# Patient Record
Sex: FEMALE | Race: WHITE
[De-identification: names, ages, dates, MRNs, and addresses within clinical notes are randomized per-mention and may not be internally consistent; named-entity substitution may affect disease eponyms.]

---

## 2017-02-08 ENCOUNTER — HOSPITAL ENCOUNTER (OUTPATIENT)
Dept: HOSPITAL 80 - FIMAGING | Age: 57
End: 2017-02-08
Attending: FAMILY MEDICINE
Payer: COMMERCIAL

## 2017-02-08 DIAGNOSIS — M85.80: ICD-10-CM

## 2017-02-08 DIAGNOSIS — Z13.820: Primary | ICD-10-CM

## 2017-02-08 NOTE — DX
DEXA Bone Mineral Densitometry    



Clinical Indications:  Postmenopausal, screening for osteoporosis



Comparison: None



Technique:  Bone Mineral Densitometry (BMD) by Dual Energy X-Ray Absorptiometry (DEXA) was performed 
utilizing the [employer] scanner.  The lumbar spine was evaluated in the AP projection.  The bilat
eral hips and  forearm were evaluated in the AP projection. Vertebral fracture assessment was also pe
rformed.



AP Lumbar Spine:  The L1, L2 and L4 vertebral bodies were evaluated. L3 is excluded due to degenerati
ve sclerosis. 

BMD:  1.327 gm/cm2 

T-score:  1.2 SD 

Z-score:  1.9 SD





AP Left Hip: Neck

BMD:  0.924 gm/cm2 

T-score:  -0.8 SD 

Z-score: 0.1 SD





AP Right Hip: Neck

BMD:  0.891 gm/cm2 

T-score:  -1.1 SD 

Z-score: -0.1 SD



 

AP Left Forearm, 1/3:  

BMD:  0.872 gm/cm2 

T-score:  0 SD 

Z-score: 0.7 SD





Vertebral Fracture Assessment:  No significant fracture deformity. No prevertebral aortic calcificati
on, significant marginal bone spurring, facet arthrosis,  or intrinsic vertebral body sclerosis that 
would effect the accuracy of the lumbar spine BMD measurement.



Conclusion: Considering the lowest measured site, the patient  has low bone density. 

The ten year FRAX risk for any major osteoporotic fracture is 6.6% and for a hip fracture is 0.4%.

Any bone loss in this patient is probably related to aging or estrogen deficiency.



To prevent osteoporosis and to promote the patient's bone density, the following recommendations shou
ld be considered:

1.  Pursue a regular regimen of weightbearing and muscle strengthening exercises in order to reduce t
he risk of falls and fractures (as tolerated by the patient's general medical condition).

2. Ensure that daily dietary calcium uptake is maximized.

3. Consider checking the serum vitamin D level. Ensure that intake of vitamin D is 600 IU per day (fo
r all ages through 70) .

4.  Consider follow-up DEXA scan in two years  to assess the rate of bone loss in this patient.

## 2017-11-06 ENCOUNTER — HOSPITAL ENCOUNTER (OUTPATIENT)
Dept: HOSPITAL 80 - FIMAGING | Age: 57
End: 2017-11-06
Attending: FAMILY MEDICINE
Payer: COMMERCIAL

## 2017-11-06 DIAGNOSIS — Z12.31: Primary | ICD-10-CM

## 2017-11-06 PROCEDURE — G0202 SCR MAMMO BI INCL CAD: HCPCS

## 2018-11-27 ENCOUNTER — HOSPITAL ENCOUNTER (OUTPATIENT)
Dept: HOSPITAL 80 - FIMAGING | Age: 58
End: 2018-11-27
Attending: FAMILY MEDICINE
Payer: COMMERCIAL

## 2018-11-27 DIAGNOSIS — Z12.31: Primary | ICD-10-CM
